# Patient Record
Sex: FEMALE | Race: WHITE | Employment: PART TIME | URBAN - METROPOLITAN AREA
[De-identification: names, ages, dates, MRNs, and addresses within clinical notes are randomized per-mention and may not be internally consistent; named-entity substitution may affect disease eponyms.]

---

## 2020-01-31 ENCOUNTER — ANESTHESIA (OUTPATIENT)
Dept: SURGERY | Facility: CLINIC | Age: 20
End: 2020-01-31
Payer: COMMERCIAL

## 2020-01-31 ENCOUNTER — HOSPITAL ENCOUNTER (OUTPATIENT)
Facility: CLINIC | Age: 20
Setting detail: OBSERVATION
Discharge: HOME | End: 2020-02-01
Attending: EMERGENCY MEDICINE | Admitting: EMERGENCY MEDICINE
Payer: COMMERCIAL

## 2020-01-31 ENCOUNTER — ANESTHESIA EVENT (OUTPATIENT)
Dept: SURGERY | Facility: CLINIC | Age: 20
End: 2020-01-31
Payer: COMMERCIAL

## 2020-01-31 DIAGNOSIS — K22.2 ACUTE ESOPHAGEAL OBSTRUCTION: ICD-10-CM

## 2020-01-31 DIAGNOSIS — K22.2 ESOPHAGEAL STRICTURE: ICD-10-CM

## 2020-01-31 DIAGNOSIS — K22.2 ESOPHAGEAL OBSTRUCTION: Primary | ICD-10-CM

## 2020-01-31 LAB
ANION GAP SERPL CALCULATED.3IONS-SCNC: 4 MMOL/L (ref 3–14)
BUN SERPL-MCNC: 11 MG/DL (ref 7–30)
CALCIUM SERPL-MCNC: 9.2 MG/DL (ref 8.5–10.1)
CHLORIDE SERPL-SCNC: 106 MMOL/L (ref 94–109)
CO2 SERPL-SCNC: 29 MMOL/L (ref 20–32)
CREAT SERPL-MCNC: 0.59 MG/DL (ref 0.52–1.04)
ERYTHROCYTE [DISTWIDTH] IN BLOOD BY AUTOMATED COUNT: 13.2 % (ref 10–15)
GFR SERPL CREATININE-BSD FRML MDRD: >90 ML/MIN/{1.73_M2}
GLUCOSE SERPL-MCNC: 90 MG/DL (ref 70–99)
HCG UR QL: NEGATIVE
HCT VFR BLD AUTO: 42.1 % (ref 35–47)
HGB BLD-MCNC: 13.3 G/DL (ref 11.7–15.7)
INR PPP: 1.15 (ref 0.86–1.14)
MCH RBC QN AUTO: 27.2 PG (ref 26.5–33)
MCHC RBC AUTO-ENTMCNC: 31.6 G/DL (ref 31.5–36.5)
MCV RBC AUTO: 86 FL (ref 78–100)
PLATELET # BLD AUTO: 300 10E9/L (ref 150–450)
POTASSIUM SERPL-SCNC: 3.5 MMOL/L (ref 3.4–5.3)
RBC # BLD AUTO: 4.89 10E12/L (ref 3.8–5.2)
SODIUM SERPL-SCNC: 139 MMOL/L (ref 133–144)
WBC # BLD AUTO: 7.6 10E9/L (ref 4–11)

## 2020-01-31 PROCEDURE — 37000008 ZZH ANESTHESIA TECHNICAL FEE, 1ST 30 MIN: Performed by: INTERNAL MEDICINE

## 2020-01-31 PROCEDURE — 25000128 H RX IP 250 OP 636: Performed by: STUDENT IN AN ORGANIZED HEALTH CARE EDUCATION/TRAINING PROGRAM

## 2020-01-31 PROCEDURE — 25000566 ZZH SEVOFLURANE, EA 15 MIN: Performed by: INTERNAL MEDICINE

## 2020-01-31 PROCEDURE — 88305 TISSUE EXAM BY PATHOLOGIST: CPT | Performed by: INTERNAL MEDICINE

## 2020-01-31 PROCEDURE — 96374 THER/PROPH/DIAG INJ IV PUSH: CPT | Performed by: EMERGENCY MEDICINE

## 2020-01-31 PROCEDURE — 36000051 ZZH SURGERY LEVEL 2 1ST 30 MIN - UMMC: Performed by: INTERNAL MEDICINE

## 2020-01-31 PROCEDURE — 85027 COMPLETE CBC AUTOMATED: CPT | Performed by: STUDENT IN AN ORGANIZED HEALTH CARE EDUCATION/TRAINING PROGRAM

## 2020-01-31 PROCEDURE — 85610 PROTHROMBIN TIME: CPT | Performed by: STUDENT IN AN ORGANIZED HEALTH CARE EDUCATION/TRAINING PROGRAM

## 2020-01-31 PROCEDURE — 25000125 ZZHC RX 250: Performed by: STUDENT IN AN ORGANIZED HEALTH CARE EDUCATION/TRAINING PROGRAM

## 2020-01-31 PROCEDURE — 99220 ZZC INITIAL OBSERVATION CARE,LEVL III: CPT | Mod: Z6 | Performed by: PHYSICIAN ASSISTANT

## 2020-01-31 PROCEDURE — 71000014 ZZH RECOVERY PHASE 1 LEVEL 2 FIRST HR: Performed by: INTERNAL MEDICINE

## 2020-01-31 PROCEDURE — 36000053 ZZH SURGERY LEVEL 2 EA 15 ADDTL MIN - UMMC: Performed by: INTERNAL MEDICINE

## 2020-01-31 PROCEDURE — G0378 HOSPITAL OBSERVATION PER HR: HCPCS

## 2020-01-31 PROCEDURE — 81025 URINE PREGNANCY TEST: CPT | Performed by: EMERGENCY MEDICINE

## 2020-01-31 PROCEDURE — 99285 EMERGENCY DEPT VISIT HI MDM: CPT | Mod: 25 | Performed by: EMERGENCY MEDICINE

## 2020-01-31 PROCEDURE — 80048 BASIC METABOLIC PNL TOTAL CA: CPT | Performed by: STUDENT IN AN ORGANIZED HEALTH CARE EDUCATION/TRAINING PROGRAM

## 2020-01-31 PROCEDURE — 25800030 ZZH RX IP 258 OP 636: Performed by: EMERGENCY MEDICINE

## 2020-01-31 PROCEDURE — 27210794 ZZH OR GENERAL SUPPLY STERILE: Performed by: INTERNAL MEDICINE

## 2020-01-31 PROCEDURE — 25800030 ZZH RX IP 258 OP 636: Performed by: STUDENT IN AN ORGANIZED HEALTH CARE EDUCATION/TRAINING PROGRAM

## 2020-01-31 PROCEDURE — 25800030 ZZH RX IP 258 OP 636: Performed by: ANESTHESIOLOGY

## 2020-01-31 PROCEDURE — 25000128 H RX IP 250 OP 636: Performed by: EMERGENCY MEDICINE

## 2020-01-31 PROCEDURE — 40000170 ZZH STATISTIC PRE-PROCEDURE ASSESSMENT II: Performed by: INTERNAL MEDICINE

## 2020-01-31 PROCEDURE — 25000125 ZZHC RX 250: Performed by: INTERNAL MEDICINE

## 2020-01-31 PROCEDURE — 37000009 ZZH ANESTHESIA TECHNICAL FEE, EACH ADDTL 15 MIN: Performed by: INTERNAL MEDICINE

## 2020-01-31 RX ORDER — ONDANSETRON 2 MG/ML
4 INJECTION INTRAMUSCULAR; INTRAVENOUS EVERY 30 MIN PRN
Status: DISCONTINUED | OUTPATIENT
Start: 2020-01-31 | End: 2020-02-01 | Stop reason: HOSPADM

## 2020-01-31 RX ORDER — ACETAMINOPHEN 650 MG/1
650 SUPPOSITORY RECTAL EVERY 4 HOURS PRN
Status: DISCONTINUED | OUTPATIENT
Start: 2020-01-31 | End: 2020-02-01 | Stop reason: HOSPADM

## 2020-01-31 RX ORDER — ACETAMINOPHEN 325 MG/1
650 TABLET ORAL EVERY 4 HOURS PRN
Status: DISCONTINUED | OUTPATIENT
Start: 2020-01-31 | End: 2020-02-01 | Stop reason: CLARIF

## 2020-01-31 RX ORDER — LIDOCAINE 40 MG/G
CREAM TOPICAL
Status: DISCONTINUED | OUTPATIENT
Start: 2020-01-31 | End: 2020-02-01 | Stop reason: HOSPADM

## 2020-01-31 RX ORDER — FENTANYL CITRATE 50 UG/ML
INJECTION, SOLUTION INTRAMUSCULAR; INTRAVENOUS PRN
Status: DISCONTINUED | OUTPATIENT
Start: 2020-01-31 | End: 2020-01-31

## 2020-01-31 RX ORDER — EPINEPHRINE 0.3 MG/.3ML
0.3 INJECTION SUBCUTANEOUS PRN
COMMUNITY

## 2020-01-31 RX ORDER — NALOXONE HYDROCHLORIDE 0.4 MG/ML
.1-.4 INJECTION, SOLUTION INTRAMUSCULAR; INTRAVENOUS; SUBCUTANEOUS
Status: DISCONTINUED | OUTPATIENT
Start: 2020-01-31 | End: 2020-02-01 | Stop reason: HOSPADM

## 2020-01-31 RX ORDER — SODIUM CHLORIDE, SODIUM LACTATE, POTASSIUM CHLORIDE, CALCIUM CHLORIDE 600; 310; 30; 20 MG/100ML; MG/100ML; MG/100ML; MG/100ML
INJECTION, SOLUTION INTRAVENOUS CONTINUOUS
Status: DISCONTINUED | OUTPATIENT
Start: 2020-01-31 | End: 2020-02-01 | Stop reason: HOSPADM

## 2020-01-31 RX ORDER — ONDANSETRON 2 MG/ML
INJECTION INTRAMUSCULAR; INTRAVENOUS PRN
Status: DISCONTINUED | OUTPATIENT
Start: 2020-01-31 | End: 2020-01-31

## 2020-01-31 RX ORDER — SODIUM CHLORIDE, SODIUM LACTATE, POTASSIUM CHLORIDE, CALCIUM CHLORIDE 600; 310; 30; 20 MG/100ML; MG/100ML; MG/100ML; MG/100ML
INJECTION, SOLUTION INTRAVENOUS CONTINUOUS PRN
Status: DISCONTINUED | OUTPATIENT
Start: 2020-01-31 | End: 2020-01-31

## 2020-01-31 RX ORDER — ONDANSETRON 4 MG/1
4 TABLET, ORALLY DISINTEGRATING ORAL EVERY 30 MIN PRN
Status: DISCONTINUED | OUTPATIENT
Start: 2020-01-31 | End: 2020-02-01 | Stop reason: HOSPADM

## 2020-01-31 RX ORDER — FLUMAZENIL 0.1 MG/ML
0.2 INJECTION, SOLUTION INTRAVENOUS
Status: DISCONTINUED | OUTPATIENT
Start: 2020-01-31 | End: 2020-02-01 | Stop reason: HOSPADM

## 2020-01-31 RX ORDER — ONDANSETRON 2 MG/ML
4 INJECTION INTRAMUSCULAR; INTRAVENOUS EVERY 6 HOURS PRN
Status: DISCONTINUED | OUTPATIENT
Start: 2020-01-31 | End: 2020-02-01 | Stop reason: HOSPADM

## 2020-01-31 RX ORDER — ONDANSETRON 4 MG/1
4 TABLET, ORALLY DISINTEGRATING ORAL EVERY 6 HOURS PRN
Status: DISCONTINUED | OUTPATIENT
Start: 2020-01-31 | End: 2020-02-01 | Stop reason: HOSPADM

## 2020-01-31 RX ORDER — DIPHENHYDRAMINE HCL 25 MG
25 TABLET ORAL EVERY 6 HOURS PRN
COMMUNITY

## 2020-01-31 RX ORDER — KETOROLAC TROMETHAMINE 30 MG/ML
30 INJECTION, SOLUTION INTRAMUSCULAR; INTRAVENOUS ONCE
Status: COMPLETED | OUTPATIENT
Start: 2020-01-31 | End: 2020-01-31

## 2020-01-31 RX ORDER — SODIUM CHLORIDE 9 MG/ML
1000 INJECTION, SOLUTION INTRAVENOUS CONTINUOUS
Status: DISCONTINUED | OUTPATIENT
Start: 2020-01-31 | End: 2020-02-01 | Stop reason: HOSPADM

## 2020-01-31 RX ORDER — PROPOFOL 10 MG/ML
INJECTION, EMULSION INTRAVENOUS PRN
Status: DISCONTINUED | OUTPATIENT
Start: 2020-01-31 | End: 2020-01-31

## 2020-01-31 RX ORDER — DEXAMETHASONE SODIUM PHOSPHATE 4 MG/ML
INJECTION, SOLUTION INTRA-ARTICULAR; INTRALESIONAL; INTRAMUSCULAR; INTRAVENOUS; SOFT TISSUE PRN
Status: DISCONTINUED | OUTPATIENT
Start: 2020-01-31 | End: 2020-01-31

## 2020-01-31 RX ORDER — IBUPROFEN 200 MG
200 TABLET ORAL EVERY 4 HOURS PRN
COMMUNITY

## 2020-01-31 RX ORDER — MEPERIDINE HYDROCHLORIDE 25 MG/ML
12.5 INJECTION INTRAMUSCULAR; INTRAVENOUS; SUBCUTANEOUS
Status: DISCONTINUED | OUTPATIENT
Start: 2020-01-31 | End: 2020-02-01 | Stop reason: HOSPADM

## 2020-01-31 RX ORDER — GLYCOPYRROLATE 0.2 MG/ML
INJECTION, SOLUTION INTRAMUSCULAR; INTRAVENOUS PRN
Status: DISCONTINUED | OUTPATIENT
Start: 2020-01-31 | End: 2020-01-31

## 2020-01-31 RX ORDER — FENTANYL CITRATE 50 UG/ML
25-50 INJECTION, SOLUTION INTRAMUSCULAR; INTRAVENOUS EVERY 5 MIN PRN
Status: DISCONTINUED | OUTPATIENT
Start: 2020-01-31 | End: 2020-02-01 | Stop reason: HOSPADM

## 2020-01-31 RX ORDER — SODIUM CHLORIDE 9 MG/ML
INJECTION, SOLUTION INTRAVENOUS CONTINUOUS
Status: DISCONTINUED | OUTPATIENT
Start: 2020-01-31 | End: 2020-02-01 | Stop reason: HOSPADM

## 2020-01-31 RX ADMIN — PROPOFOL 50 MG: 10 INJECTION, EMULSION INTRAVENOUS at 22:05

## 2020-01-31 RX ADMIN — GLYCOPYRROLATE 0.1 MG: 0.2 INJECTION, SOLUTION INTRAMUSCULAR; INTRAVENOUS at 21:59

## 2020-01-31 RX ADMIN — FENTANYL CITRATE 50 MCG: 50 INJECTION, SOLUTION INTRAMUSCULAR; INTRAVENOUS at 21:59

## 2020-01-31 RX ADMIN — PROPOFOL 150 MG: 10 INJECTION, EMULSION INTRAVENOUS at 21:59

## 2020-01-31 RX ADMIN — MIDAZOLAM 1 MG: 1 INJECTION INTRAMUSCULAR; INTRAVENOUS at 21:50

## 2020-01-31 RX ADMIN — KETOROLAC TROMETHAMINE 30 MG: 30 INJECTION, SOLUTION INTRAMUSCULAR at 19:32

## 2020-01-31 RX ADMIN — PROPOFOL 30 MG: 10 INJECTION, EMULSION INTRAVENOUS at 22:24

## 2020-01-31 RX ADMIN — SODIUM CHLORIDE 1000 ML: 9 INJECTION, SOLUTION INTRAVENOUS at 19:32

## 2020-01-31 RX ADMIN — SODIUM CHLORIDE, POTASSIUM CHLORIDE, SODIUM LACTATE AND CALCIUM CHLORIDE: 600; 310; 30; 20 INJECTION, SOLUTION INTRAVENOUS at 21:50

## 2020-01-31 RX ADMIN — PROPOFOL 10 MG: 10 INJECTION, EMULSION INTRAVENOUS at 22:15

## 2020-01-31 RX ADMIN — SODIUM CHLORIDE, POTASSIUM CHLORIDE, SODIUM LACTATE AND CALCIUM CHLORIDE: 600; 310; 30; 20 INJECTION, SOLUTION INTRAVENOUS at 23:15

## 2020-01-31 RX ADMIN — PROPOFOL 20 MG: 10 INJECTION, EMULSION INTRAVENOUS at 22:20

## 2020-01-31 RX ADMIN — DEXAMETHASONE SODIUM PHOSPHATE 4 MG: 4 INJECTION, SOLUTION INTRA-ARTICULAR; INTRALESIONAL; INTRAMUSCULAR; INTRAVENOUS; SOFT TISSUE at 22:16

## 2020-01-31 RX ADMIN — Medication 100 MG: at 21:59

## 2020-01-31 RX ADMIN — PROPOFOL 10 MG: 10 INJECTION, EMULSION INTRAVENOUS at 22:29

## 2020-01-31 RX ADMIN — ONDANSETRON 4 MG: 2 INJECTION INTRAMUSCULAR; INTRAVENOUS at 22:16

## 2020-01-31 ASSESSMENT — MIFFLIN-ST. JEOR: SCORE: 1434.93

## 2020-01-31 NOTE — ED NOTES
Bed: ED18  Expected date: 1/31/20  Expected time:   Means of arrival:   Comments:  Ninfa Vitale 19F with esophageal obstruction, ?ulcer from pill taken last night. Not able to swallow secretions.      Katherine Clarke MD  01/31/20 [id number]

## 2020-01-31 NOTE — LETTER
Patient:  Dakota Vitale  :   2000  MRN:     [id number]        Ms.Madeline CHIDI Vitale  [address] [facility] [address]        2020    Dear ,    We are writing to inform you of your test results. In short, the biopsies of your esophagus returned as expected supporting a diagnosis of eosinophilic esophagitis. As discussed previously, our plan includes the topical steroid, daily PPI and repeat endoscopy which may be with me but preferably with my partner Dr Hobbs who I will ask to manage your case in the future as this is within his subspecialized expertise.     I have included the formal documtentation of the results below. It continues to be a pleasure participating in your care.  Please feel free to contact our clinic with any further questions.      Sincerely,    Alexander Yi MD PhD FACG CINTHYA LÓPEZ  Director of Endoscopy  Associate Professor of Medicine, Surgery and Pediatrics  Interventional and Therapeutic Endoscopy    [facility]  Division of Gastroenterology and Hepatology  [facility] [address] [address]    New Consultations  [phone]  Procedure Scheduling [phone]  Clinical Nurse Coordinator [phone]  Clinical Fax   [phone]  Administrative   [phone]  Administrative Fax  [phone]        Resulted Orders   Surgical pathology exam   Result Value Ref Range    Copath Report       Patient Name: DAKOTA VITALE  MR#: [id number]  Specimen #: [id number]  Collected: 2020  Received: 2020  Reported: 2020 16:35  Ordering Phy(s): ALEXANDER YI    For improved result formatting, select 'View Enhanced Report Format' under   Linked Documents section.    SPECIMEN(S):  Esophageal biopsy    FINAL DIAGNOSIS:  Esophagus, Biopsy:  Esophagitis with up to 45 intra-squamous epithelial eosinophils per HPF;   consistent with eosinophilic  esophagitis.    I have personally reviewed  "all specimens and/or slides, including the   listed special stains, and used them  with my medical judgement to determine or confirm the final diagnosis.    Electronically signed out by:    Maribel Orta M.D., [facility], [facility]    CLINICAL HISTORY:  Food impaction of esophagus, rule out eosinophilic esophagitis.    GROSS:  The specimen is received in formalin with proper patient identification,   labeled \"esophagus rule out  eosinophilic esophagitis\".  The specimen consists of mu ltiple segments of   white- tan soft tissue ranging from  0.1-0.3 cm in greatest dimension.  It is filtered, wrapped and entirely   submitted in cassette A1. (Dictated  by: Elina BAI [facility] 2/3/2020 09:30 AM)    MICROSCOPIC:  Microscopic examination was performed.    The technical component of this testing was completed at the [facility], with the professional component performed   at the [facility], [address] [place] [phone] ([phone])    CPT Codes:  A: 82884-QL6    COLLECTION SITE:  Client: [facility]  Location: [facility] (B)    Resident  [id number]               "

## 2020-01-31 NOTE — ED TRIAGE NOTES
Patient presents to triage with swollen esophagus after taking an ibuprofen last night. Patient has been unable to swallow secretions since then and cannot tolerate any intake. No difficulty breathing or speaking. Sent here from [place] for GI consult and probable endoscopy.

## 2020-01-31 NOTE — ED PROVIDER NOTES
History     Chief Complaint   Patient presents with     Swallowed Foreign Body     HPI  Tracie Vitale is a 19 year old otherwise healthy female who presents the emergency department with chief complaint of difficulty swallowing.  The patient took an ibuprofen tablet last night.  Immediately, she developed pain and difficulty swallowing.  She has had a sensation of having the pill stuck in her throat since then.  She did not present for evaluation until later today because she went to her classes (she is currently a nursing student).  She is currently unable to swallow even her secretions.  She is also tried to drink water and has not been able to swallow this completely.  The patient has had swallowing difficulty in the past with pills, particularly round pills.  The patient took the pill approximately 10 PM last night, nothing else taken p.o around the time of onset of symptoms.  An x-ray was done at [facility] which showed signs concerning for esophageal spasm.  Possible pill fragments seen in the esophagus on x-ray as well.  Due to history, there is concern for esophageal ulcer as well.  She was sent here for further evaluation by GI due to these findings.  No history of eosinophilic esophagitis or other esophageal obstruction in the past.  No associated difficulty breathing or speaking.    I have reviewed the Medications, Allergies, Past Medical and Surgical History, and Social History in the Epic system.  No past medical history on file.  No past surgical history on file.  No current facility-administered medications for this encounter.      No current outpatient medications on file.        Allergies   Allergen Reactions     Coconut Oil Anaphylaxis     Sesame Oil Anaphylaxis     Tree Nuts [Nuts] Anaphylaxis     Social History     Socioeconomic History     Marital status: Not on file     Spouse name: Not on file     Number of children: Not on file     Years of education: Not on file      Highest education level: Not on file   Occupational History     Not on file   Social Needs     Financial resource strain: Not on file     Food insecurity:     Worry: Not on file     Inability: Not on file     Transportation needs:     Medical: Not on file     Non-medical: Not on file   Tobacco Use     Smoking status: Not on file   Substance and Sexual Activity     Alcohol use: Not on file     Drug use: Not on file     Sexual activity: Not on file   Lifestyle     Physical activity:     Days per week: Not on file     Minutes per session: Not on file     Stress: Not on file   Relationships     Social connections:     Talks on phone: Not on file     Gets together: Not on file     Attends Buddhist service: Not on file     Active member of club or organization: Not on file     Attends meetings of clubs or organizations: Not on file     Relationship status: Not on file     Intimate partner violence:     Fear of current or ex partner: Not on file     Emotionally abused: Not on file     Physically abused: Not on file     Forced sexual activity: Not on file   Other Topics Concern     Not on file   Social History Narrative     Not on file       Review of Systems  General: No fevers or chills  Skin: No rash or diaphoresis  Eyes: No eye redness or discharge  Ears/Nose/Throat: No rhinorrhea or nasal congestion  Respiratory: No cough or SOB  Cardiovascular: No chest pain or palpitations  Gastrointestinal: No nausea, vomiting, or diarrhea, positive for difficulty swallowing  Genitourinary: No urinary frequency, hematuria, or dysuria  Musculoskeletal: No arthralgias or myalgias  Neurologic: No numbness or weakness  Hematologic/Lymphatic/Immunologic: No leg swelling, no easy bruising/bleeding  Endocrine: No polyuria/polydypsia      Physical Exam          Physical Exam  General: Well nourished, well developed, NAD  HEENT: EOMI, anicteric. NCAT, MMM, unable to tolerate swallowing own secretions  Neck: no jugular venous distension,  supple, nl ROM  Cardiac: Regular rate and rhythm. No murmurs, rubs, or gallops. Normal S1, S2.  Intact peripheral pulses  Pulm: CTAB, no stridor, wheezes, rales, rhonchi  Abd: Soft, nontender, nondistended.  No masses palpated.    Skin: Warm and dry to the touch.  No rash  Extremities: No LE edema, no cyanosis, w/w/p  Neuro: A&Ox3, no gross focal deficits        ED Course        Procedures             Critical Care time:  none             Labs Ordered and Resulted from Time of ED Arrival Up to the Time of Departure from the ED   INR - Abnormal; Notable for the following components:       Result Value    INR 1.15 (*)     All other components within normal limits   CBC WITH PLATELETS   BASIC METABOLIC PANEL          Results for orders placed or performed during the hospital encounter of 01/31/20 (from the past 24 hour(s))   HCG qualitative urine   Result Value Ref Range    HCG Qual Urine Negative NEG^Negative   UPPER GI ENDOSCOPY   Result Value Ref Range    Upper GI Endoscopy       [address] [place] [address] [phone]     Endoscopy Department  _______________________________________________________________________________  Patient Name: Tracie Vitale      Procedure Date: 1/31/2020 10:05 PM  MRN: [id number]                       Account Number: [id number]  YOB: 2000             Admit Type: Inpatient  Age: 19                                Gender: Female  Note Status: Finalized                Attending MD: Alexander Miller MD  Total Sedation Time:                    _______________________________________________________________________________     Procedure:           Upper GI endoscopy  Indications:         Foreign body in the esophagus  Providers:           Alexander Miller MD, Jelena Aceves  Patient Profile:     This is a healthy 19 year old who reports long standing                        issues with intermittent dysphagia (relieved with fluid                         intake) worse with taking pi lls who presents to the                        hospital after being unable to clear her salivary                        secretions since 10 PM last night. Reports taking a                        small ibuprofen pill at that time and ate salad around                        8:30 PM.  Referring MD:          Medicines:           General Anesthesia  Complications:       No immediate complications.  _______________________________________________________________________________  Procedure:           Pre-Anesthesia Assessment:                       - Prior to the procedure, a History and Physical was                        performed, and patient medications and allergies were                        reviewed. The patient is competent. The risks and                        benefits of the procedure and the sedation options and                        risks were discussed with the patient. All questions                        were answered and informed consent was obtained. Patient                         identification and proposed procedure were verified by                        the physician in the pre-procedure area. Mental Status                        Examination: alert and oriented. Airway Examination:                        normal oropharyngeal airway and neck mobility.                        Respiratory Examination: clear to auscultation. CV                        Examination: normal. Prophylactic Antibiotics: The                        patient does not require prophylactic antibiotics. Prior                        Anticoagulants: The patient has taken no previous                        anticoagulant or antiplatelet agents. ASA Grade                        Assessment: I - A normal, healthy patient. After                        reviewing the risks and benefits, the patient was deemed                        in satisfactory condition to undergo the procedure. The                         anesthesia plan was to use general anesthesia.                        Immediately prior  to administration of medications, the                        patient was re-assessed for adequacy to receive                        sedatives. The heart rate, respiratory rate, oxygen                        saturations, blood pressure, adequacy of pulmonary                        ventilation, and response to care were monitored                        throughout the procedure. The physical status of the                        patient was re-assessed after the procedure. After                        obtaining informed consent, the endoscope was passed                        under direct vision. Throughout the procedure, the                        patient's blood pressure, pulse, and oxygen saturations                        were monitored continuously. The endoscope was                        introduced through the mouth, and advanced to the second                        part of duodenum. The upper GI endoscopy was                        accomplished without difficulty. The patient tole rated                        the procedure well.                                                                                   Findings:       Impacted food and pill material was found in the middle third of the        esophagus from 24 to 25 cm. We were able to use the rat tooth forceps to        remove a portion and push the remaing portion in to the stomach. The        area of the impacted food had developed superficial pressure ulceration.       Mucosal changes including ringed esophagus, longitudinal furrows,        small-caliber esophagus and white plaques were found in the entire        esophagus. Esophageal findings were graded using the Eosinophilic        Esophagitis Endoscopic Reference Score (EoE-EREFS) as: Edema Grade 1        Present (decreased clarity or absence of vascular markings), Rings Grade        3 Severe  (distinct rings that do not permit passage of diagnostic 8-10        mm endoscope), Exudates Grade 1 Mild (scattered white lesions involving        less than  10 percent of the esophageal surface area), Furrows Grade 1        Present (vertical lines with or without visible depth) and Stricture        present (8 mm luminal diameter). Biopsies were taken with from the mid        esophagus with a cold forceps for histology.       One benign-appearing, intrinsic severe (stenosis; an endoscope cannot        pass) stenosis was found 23 to 24 cm from the incisors. This stenosis        measured 8 mm (inner diameter) x 1 cm (in length). The stenosis was        traversed after downsizing scope to a pediatric endoscope.       The gastric fundus, gastric body, gastric antrum and pylorus were normal.       The duodenal bulb, first portion of the duodenum, second portion of the        duodenum and major papilla were normal.                                                                                   Impression:          - The cause of patient's symptoms is stricturing in mid                        esophagus with a food/pill impaction which was                         successfully cleared endoscopically. The stricturing and                        esophageal mucosal changes are highly suggestive of                        eosinophilic esophagiits. The mid esophageal stricture                        was severe and 8 mm in diameter and we were unable to                        pass the adult gastroscope beyond the stricture                        necessitating downsizing to a pediatric gastroscope to                        complete the endoscopic examination. Biopsies taken from                        mid esophagus to confirm eosinophilic esophagitis.  Recommendation:      - We will follow up on the pathology results.                       - Patient should be started on high dose PPI twice daily                        in liquid  form (Pantoprazole oral suspension 40 mg BID)                       - Please prescribe Fluticasone 220 mcgs inhaler on                        discharge. Patient will need to take two puffs twice                         daily and swallow instead of inhaling. She should stay                        upright for at least 30 min after swallowing the                        contents of the inhaler.                       - Patient should be on low residue diet till she has a                        follow up. Instructed to chew food properly before                        swallowing.                       - She will need a repeat upper endoscopy in 4 weeks to                        reassess for response to her therapy and need for                        dilation                                                                                     electronically signed by BETHANY Miller  _____________________  Alexander Miller MD  2/1/2020 7:27:57 AM  I was physically present for the entire viewing portion of the exam.  __________________________  Signature of teaching physician  Jamia/Rhys Miller MD    ____________  Jelena Aceves,   Number of Addenda: 0    Note Initiated On: 1/31/2020 10:05 PM  Scope In:   Scope Out:         Labs, vital signs, and imaging studies were reviewed by me.    Assessments & Plan (with Medical Decision Making)   The patient is a 19-year-old female presenting with inability to swallow after taking a pill last night.  X-ray at [place] was reportedly concerning for esophageal obstruction with possible retained pill fragments, differential diagnosis includes esophageal stricture, food impaction, esophageal ulcer (secondary to pill entrapment in the esophagus), esophageal spasm, eosinophilic esophagitis.    1719 Pt was d/w GI, they will come to the ER to evaluate the patient    Patient has not been able to tolerate p.o. since yesterday, she states that she feels dehydrated.  IV fluids were ordered.  She  also states that she has a mild headache, which she feels is consistent with dehydration.  Toradol to also be administered IV.    I have reviewed the nursing notes.    I have reviewed the findings, diagnosis, plan and need for follow up with the patient.  GI has seen the patient in the emergency department, they plan to take the patient to the OR for endoscopy later this evening when the OR is available.  However, they suspect that OR will not be available for some time and recommend that patient be admitted for observation overnight after the procedure.  Patient was discussed with ED observation unit, they will admit the patient..    Discharge Medication List as of 2/1/2020 10:13 AM      START taking these medications    Details   fluticasone (FLOVENT HFA) 220 MCG/ACT inhaler Inhale 2 puffs into the lungs every 12 hours, Disp-12 g, R-1, E-Prescribe      pantoprazole (PROTONIX) 2 mg/mL SUSP suspension Take 20 mLs (40 mg) by mouth 2 times daily (before meals), Disp-1600 mL, R-0, E-Prescribe             Final diagnoses:   Acute esophageal obstruction       1/31/2020   [facility], [place], EMERGENCY DEPARTMENT     Katherine Clarke MD  02/01/20 2018

## 2020-02-01 VITALS
OXYGEN SATURATION: 97 % | TEMPERATURE: 96.3 F | RESPIRATION RATE: 16 BRPM | SYSTOLIC BLOOD PRESSURE: 101 MMHG | WEIGHT: 138.6 LBS | HEART RATE: 64 BPM | BODY MASS INDEX: 21.01 KG/M2 | DIASTOLIC BLOOD PRESSURE: 41 MMHG | HEIGHT: 68 IN

## 2020-02-01 LAB — UPPER GI ENDOSCOPY: NORMAL

## 2020-02-01 PROCEDURE — 25800030 ZZH RX IP 258 OP 636: Performed by: PHYSICIAN ASSISTANT

## 2020-02-01 PROCEDURE — 96361 HYDRATE IV INFUSION ADD-ON: CPT

## 2020-02-01 PROCEDURE — 25000132 ZZH RX MED GY IP 250 OP 250 PS 637: Performed by: PHYSICIAN ASSISTANT

## 2020-02-01 PROCEDURE — G0378 HOSPITAL OBSERVATION PER HR: HCPCS

## 2020-02-01 PROCEDURE — 99217 ZZC OBSERVATION CARE DISCHARGE: CPT | Mod: Z6 | Performed by: INTERNAL MEDICINE

## 2020-02-01 RX ORDER — FLUTICASONE PROPIONATE 220 UG/1
2 AEROSOL, METERED RESPIRATORY (INHALATION) EVERY 12 HOURS
Qty: 12 G | Refills: 1 | Status: SHIPPED | OUTPATIENT
Start: 2020-02-01

## 2020-02-01 RX ORDER — FLUTICASONE PROPIONATE 220 UG/1
2 AEROSOL, METERED RESPIRATORY (INHALATION) EVERY 12 HOURS
Status: DISCONTINUED | OUTPATIENT
Start: 2020-02-01 | End: 2020-02-01 | Stop reason: HOSPADM

## 2020-02-01 RX ADMIN — SODIUM CHLORIDE: 9 INJECTION, SOLUTION INTRAVENOUS at 02:29

## 2020-02-01 RX ADMIN — PANTOPRAZOLE SODIUM 40 MG: 40 TABLET, DELAYED RELEASE ORAL at 10:14

## 2020-02-01 RX ADMIN — FLUTICASONE PROPIONATE 2 PUFF: 220 AEROSOL, METERED RESPIRATORY (INHALATION) at 10:15

## 2020-02-01 ASSESSMENT — ENCOUNTER SYMPTOMS
DIETARY ISSUES: ADEQUATE INTAKE
NO PATIENT REPORTED PAIN: 1
ACTIVITY IMPAIRMENT: NORMAL

## 2020-02-01 ASSESSMENT — MIFFLIN-ST. JEOR: SCORE: 1442.19

## 2020-02-01 NOTE — PHARMACY-ADMISSION MEDICATION HISTORY
Admission medication history interview status for the 1/31/2020 admission is complete. See Epic admission navigator for allergy information, pharmacy, prior to admission medications and immunization status.     Medication history interview sources:  Patient    Changes made to PTA medication list (reason)  Added:     Epipen: inject 0.3 mg into the muscle as needed for anaphylaxis (per patient)    Diphenhydramine 25 mg tablet: take 25 mg by mouth every 6 hours as needed for allergies (per patient)    Ibuprofen 200 mg tablet: take 200 mg by mouth every 4 hours as needed for mild pain (per patient)  Deleted: N/A  Changed: N/A    Additional medication history information (including reliability of information, actions taken by pharmacist):    The patient was a good historian of her medications. She confirms not being on any daily medications.     The patient states attempting to take 1 tablet of ibuprofen for back pain yesterday.     The patient states that she has not needed to use an Epipen in about 4 years. She was reminded to check the expiration date of her current Epipen.     Patient confirms current allergies and states no new allergies.     Patient preferred pharmacy: [facility]        Prior to Admission medications    Medication Sig Last Dose Taking? Auth Provider   EPINEPHrine (EPIPEN/ADRENACLICK/OR ANY BX GENERIC EQUIV) 0.3 MG/0.3ML injection 2-pack Inject 0.3 mg into the muscle as needed for anaphylaxis 4 years ago Yes Unknown, Entered By History   ibuprofen (ADVIL/MOTRIN) 200 MG tablet Take 200 mg by mouth every 4 hours as needed for mild pain 1/30/2020 at Unknown time Yes Unknown, Entered By History   diphenhydrAMINE (BENADRYL) 25 MG tablet Take 25 mg by mouth every 6 hours as needed for allergies More than a month at Unknown time  Unknown, Entered By History         Medication history completed by: SERENA Shook Pharmacy Intern

## 2020-02-01 NOTE — DISCHARGE SUMMARY
Discharge Summary    Tracie Vitale MRN# [id number]   YOB: 2000 Age: 19 year old     Date of Admission:  1/31/2020  Date of Discharge:  2/1/2020  Admitting Physician:  Zenaida Freeman MD  Discharge Physician:  Dr. Gill  Discharging Service:  Emergency Medicine     Primary Provider: Gilma Granados          Discharge Diagnosis:     Esophageal obstruction    * No resolved hospital problems. *               Discharge Disposition:   Discharged to home           Condition on Discharge:   Discharge condition: Stable   Code status on discharge: Full Code           Procedures:   Invasive procedures: endoscopy          Discharge Medications:     Current Discharge Medication List      START taking these medications    Details   fluticasone (FLOVENT HFA) 220 MCG/ACT inhaler Inhale 2 puffs into the lungs every 12 hours  Qty: 12 g, Refills: 1    Associated Diagnoses: Esophageal obstruction      pantoprazole (PROTONIX) 2 mg/mL SUSP suspension Take 20 mLs (40 mg) by mouth 2 times daily (before meals)  Qty: 1600 mL, Refills: 0    Associated Diagnoses: Esophageal obstruction         CONTINUE these medications which have NOT CHANGED    Details   ibuprofen (ADVIL/MOTRIN) 200 MG tablet Take 200 mg by mouth every 4 hours as needed for mild pain      diphenhydrAMINE (BENADRYL) 25 MG tablet Take 25 mg by mouth every 6 hours as needed for allergies      EPINEPHrine (EPIPEN/ADRENACLICK/OR ANY BX GENERIC EQUIV) 0.3 MG/0.3ML injection 2-pack Inject 0.3 mg into the muscle as needed for anaphylaxis                   Consultations:   Consultation during this admission received from gastroenterology             Brief History of Illness:   Please see detailed H&P from 1/31/2020, in brief: Tracie Vitale is a 19 year old female with no significant past medical history, presenting with esophageal obstruction          Hospital Course:   1. Esophageal obstruction: GI consulted and patient underwent EGD in OR.  GI was able to  "push the impacted food/pill material down into the stomach.  There was some narrowing in mid esophagus with mucousal changes suspicious for EoE, biopsies taken.  Normal stomach and duodenum.  She did well overnight and advanced to clear liquids this am with no problems.  VSS< afebrile. They recommended:  - Patient should be started on high dose PPI twice daily in liquid form (Pantoprazole oral suspension 40 mg BID)  - Please prescribe Fluticasone 220 mcgs inhaler on discharge. Patient will need to take two puffs twice daily and swallow instead of inhaling. She should stay upright for at least 30 min after swallowing the contents of the inhaler.  - Patient should be on low residue diet till she has a follow up. Instructed to chew food properly before swallowing.  - She will need a repeat upper endoscopy in 4 weeks to reassess for response to her therapy and need for dilation.            Final Day of Progress before Discharge:       Physical Exam:  Blood pressure 101/41, pulse 64, temperature 96.3  F (35.7  C), temperature source Oral, resp. rate 16, height 1.727 m (5' 8\"), weight 62.9 kg (138 lb 9.6 oz), last menstrual period 01/19/2020, SpO2 97 %, not currently breastfeeding.    EXAM:  Exam:  Constitutional: healthy, alert and no distress  Head: Normocephalic. No masses, lesions, tenderness or abnormalities  Neck: Neck supple. No adenopathy. Thyroid symmetric, normal size,,   ENT: ENT exam normal, no neck nodes or sinus tenderness  Cardiovascular: No lifts, heaves, or thrills. RRR. No murmurs, clicks gallops or rub  Respiratory: Good diaphragmatic excursion. Lungs clear  Gastrointestinal: Abdomen soft, non-tender. BS normal. No masses, organomegaly  Musculoskeletal: extremities normal- no gross deformities noted, gait normal and normal muscle tone  Skin: no suspicious lesions or rashes  Neurologic: Gait normal. Alert and oriented.   Psychiatric: mentation appears normal and affect normal/bright    /41   Pulse " "64   Temp 96.3  F (35.7  C) (Oral)   Resp 16   Ht 1.727 m (5' 8\")   Wt 62.9 kg (138 lb 9.6 oz)   LMP 01/19/2020 (Exact Date)   SpO2 97%   Breastfeeding No   BMI 21.07 kg/m               Data:  All laboratory data reviewed             Significant Results:     Results for orders placed or performed during the hospital encounter of 01/31/20   UPPER GI ENDOSCOPY     Status: None   Result Value Ref Range    Upper GI Endoscopy       [address] [place] [address] [phone]     Endoscopy Department  _______________________________________________________________________________  Patient Name: Tracie Vitale      Procedure Date: 1/31/2020 10:05 PM  MRN: [id number]                       Account Number: [id number]  YOB: 2000             Admit Type: Inpatient  Age: 19                                Gender: Female  Note Status: Finalized                Attending MD: Alexander Miller MD  Total Sedation Time:                    _______________________________________________________________________________     Procedure:           Upper GI endoscopy  Indications:         Foreign body in the esophagus  Providers:           Alexander Miller MD, Jelena Aceves  Patient Profile:     This is a healthy 19 year old who reports long standing                        issues with intermittent dysphagia (relieved with fluid                        intake) worse with taking pi lls who presents to the                        hospital after being unable to clear her salivary                        secretions since 10 PM last night. Reports taking a                        small ibuprofen pill at that time and ate salad around                        8:30 PM.  Referring MD:          Medicines:           General Anesthesia  Complications:       No immediate complications.  _______________________________________________________________________________  Procedure:           " Pre-Anesthesia Assessment:                       - Prior to the procedure, a History and Physical was                        performed, and patient medications and allergies were                        reviewed. The patient is competent. The risks and                        benefits of the procedure and the sedation options and                        risks were discussed with the patient. All questions                        were answered and informed consent was obtained. Patient                         identification and proposed procedure were verified by                        the physician in the pre-procedure area. Mental Status                        Examination: alert and oriented. Airway Examination:                        normal oropharyngeal airway and neck mobility.                        Respiratory Examination: clear to auscultation. CV                        Examination: normal. Prophylactic Antibiotics: The                        patient does not require prophylactic antibiotics. Prior                        Anticoagulants: The patient has taken no previous                        anticoagulant or antiplatelet agents. ASA Grade                        Assessment: I - A normal, healthy patient. After                        reviewing the risks and benefits, the patient was deemed                        in satisfactory condition to undergo the procedure. The                        anesthesia plan was to use general anesthesia.                        Immediately prior  to administration of medications, the                        patient was re-assessed for adequacy to receive                        sedatives. The heart rate, respiratory rate, oxygen                        saturations, blood pressure, adequacy of pulmonary                        ventilation, and response to care were monitored                        throughout the procedure. The physical status of the                        patient was  re-assessed after the procedure. After                        obtaining informed consent, the endoscope was passed                        under direct vision. Throughout the procedure, the                        patient's blood pressure, pulse, and oxygen saturations                        were monitored continuously. The endoscope was                        introduced through the mouth, and advanced to the second                        part of duodenum. The upper GI endoscopy was                        accomplished without difficulty. The patient tole rated                        the procedure well.                                                                                   Findings:       Impacted food and pill material was found in the middle third of the        esophagus from 24 to 25 cm. We were able to use the rat tooth forceps to        remove a portion and push the remaing portion in to the stomach. The        area of the impacted food had developed superficial pressure ulceration.       Mucosal changes including ringed esophagus, longitudinal furrows,        small-caliber esophagus and white plaques were found in the entire        esophagus. Esophageal findings were graded using the Eosinophilic        Esophagitis Endoscopic Reference Score (EoE-EREFS) as: Edema Grade 1        Present (decreased clarity or absence of vascular markings), Rings Grade        3 Severe (distinct rings that do not permit passage of diagnostic 8-10        mm endoscope), Exudates Grade 1 Mild (scattered white lesions involving        less than  10 percent of the esophageal surface area), Furrows Grade 1        Present (vertical lines with or without visible depth) and Stricture        present (8 mm luminal diameter). Biopsies were taken with from the mid        esophagus with a cold forceps for histology.       One benign-appearing, intrinsic severe (stenosis; an endoscope cannot        pass) stenosis was found 23 to 24 cm  from the incisors. This stenosis        measured 8 mm (inner diameter) x 1 cm (in length). The stenosis was        traversed after downsizing scope to a pediatric endoscope.       The gastric fundus, gastric body, gastric antrum and pylorus were normal.       The duodenal bulb, first portion of the duodenum, second portion of the        duodenum and major papilla were normal.                                                                                   Impression:          - The cause of patient's symptoms is stricturing in mid                        esophagus with a food/pill impaction which was                         successfully cleared endoscopically. The stricturing and                        esophageal mucosal changes are highly suggestive of                        eosinophilic esophagiits. The mid esophageal stricture                        was severe and 8 mm in diameter and we were unable to                        pass the adult gastroscope beyond the stricture                        necessitating downsizing to a pediatric gastroscope to                        complete the endoscopic examination. Biopsies taken from                        mid esophagus to confirm eosinophilic esophagitis.  Recommendation:      - We will follow up on the pathology results.                       - Patient should be started on high dose PPI twice daily                        in liquid form (Pantoprazole oral suspension 40 mg BID)                       - Please prescribe Fluticasone 220 mcgs inhaler on                        discharge. Patient will need to take two puffs twice                         daily and swallow instead of inhaling. She should stay                        upright for at least 30 min after swallowing the                        contents of the inhaler.                       - Patient should be on low residue diet till she has a                        follow up. Instructed to chew food properly before                         swallowing.                       - She will need a repeat upper endoscopy in 4 weeks to                        reassess for response to her therapy and need for                        dilation                                                                                     electronically signed by BETHANY Miller  _____________________  Alexander Miller MD  2/1/2020 7:27:57 AM  I was physically present for the entire viewing portion of the exam.  __________________________  Signature of teaching physician  Jamia/Rhys Miller MD    ____________  Jelena Aceves,   Number of Addenda: 0    Note Initiated On: 1/31/2020 10:05 PM  Scope In:   Scope Out:     CBC with platelets     Status: None   Result Value Ref Range    WBC 7.6 4.0 - 11.0 10e9/L    RBC Count 4.89 3.8 - 5.2 10e12/L    Hemoglobin 13.3 11.7 - 15.7 g/dL    Hematocrit 42.1 35.0 - 47.0 %    MCV 86 78 - 100 fl    MCH 27.2 26.5 - 33.0 pg    MCHC 31.6 31.5 - 36.5 g/dL    RDW 13.2 10.0 - 15.0 %    Platelet Count 300 150 - 450 10e9/L   Basic metabolic panel     Status: None   Result Value Ref Range    Sodium 139 133 - 144 mmol/L    Potassium 3.5 3.4 - 5.3 mmol/L    Chloride 106 94 - 109 mmol/L    Carbon Dioxide 29 20 - 32 mmol/L    Anion Gap 4 3 - 14 mmol/L    Glucose 90 70 - 99 mg/dL    Urea Nitrogen 11 7 - 30 mg/dL    Creatinine 0.59 0.52 - 1.04 mg/dL    GFR Estimate >90 >60 mL/min/[1.73_m2]    GFR Estimate If Black >90 >60 mL/min/[1.73_m2]    Calcium 9.2 8.5 - 10.1 mg/dL   INR     Status: Abnormal   Result Value Ref Range    INR 1.15 (H) 0.86 - 1.14   HCG qualitative urine     Status: None   Result Value Ref Range    HCG Qual Urine Negative NEG^Negative      No results found for this or any previous visit (from the past 48 hour(s)).             Pending Results:   Unresulted Labs Ordered in the Past 30 Days of this Admission     No orders found for last 31 day(s).                  Discharge Instructions and Follow-Up:     Discharge Procedure  Orders   GASTROENTEROLOGY ADULT REF PROCEDURE ONLY [facility] [place] [phone]   Referral Priority: Routine   Number of Visits Requested: 1     Reason for your hospital stay   Order Comments: You were admitted into observation at the hospital after getting a pill stuck in your esophagus.  GI was consulted and performed a scope under general anesthesia and pushed the pill into your stomach.  They also took biopsies to test for eosinophilic esophagitis as there was some narrowing in your esophagus.  They recommend new medications to take as well as a low residue diet, chew food fully before swallowing.  They would like a repeat EGD in 4 weeks.     Follow Up and recommended labs and tests   Order Comments: Follow up with primary care provider, Gilma Granados, within 7 days for hospital follow- up.  The following labs/tests are recommended: EGD in 4 weeks.     Activity   Order Comments: Your activity upon discharge: activity as tolerated     Order Specific Question Answer Comments   Is discharge order? Yes      When to contact your care team   Order Comments: Return to the ER if you have recurrent feelings of food getting stuck in your esophagus, vomiting, fever, blood in vomit.     Diet   Order Comments: Follow this diet upon discharge: Orders Placed This Encounter   low residue diet     Order Specific Question Answer Comments   Is discharge order? Yes           Attestation:  CAMILLE Crawford CNP.

## 2020-02-01 NOTE — PROVIDER NOTIFICATION
Provider was paged upon pt arrival to floor regarding CCM order. EM provider stated pt does not need to be on tele and RN asked for order to be discontinued. Provider also indicated pt could have ice chips post-op.

## 2020-02-01 NOTE — ANESTHESIA POSTPROCEDURE EVALUATION
Anesthesia POST Procedure Evaluation    Patient: Tracie Vitale   MRN:     [id number] Gender:   female   Age:    19 year old :      2000        Preoperative Diagnosis: Food impaction of esophagus, initial encounter [T18.128A]   Procedure(s):  Upper Endoscopy with biopsy, foreign body removal   Postop Comments: No value filed.       Anesthesia Type:  Not documented  General    Reportable Event: NO     PAIN: Uncomplicated   Sign Out status: Comfortable, Well controlled pain     PONV: No PONV   Sign Out status:  No Nausea or Vomiting     Neuro/Psych: Uneventful perioperative course   Sign Out Status: Preoperative baseline; Age appropriate mentation     Airway/Resp.: Uneventful perioperative course   Sign Out Status: Non labored breathing, age appropriate RR; Resp. Status within EXPECTED Parameters     CV: Uneventful perioperative course   Sign Out status: Appropriate BP and perfusion indices; Appropriate HR/Rhythm     Disposition:   Sign Out in:  PACU  Disposition:  Home  Recovery Course: Uneventful  Follow-Up: Not required           Last Anesthesia Record Vitals:  CRNA VITALS  2020 2207 - 2020 2258      2020             Resp Rate (observed):  (!) 6          Last PACU Vitals:  Vitals Value Taken Time   BP     Temp     Pulse     Resp     SpO2 98 % 2020 10:57 PM   Temp src     NIBP     Pulse     SpO2     Resp     Temp     Ht Rate     Temp 2     Vitals shown include unvalidated device data.      Electronically Signed By: Noman Rivers MD, 2020, 10:58 PM

## 2020-02-01 NOTE — ANESTHESIA CARE TRANSFER NOTE
Patient: Tracie Vitale    Procedure(s):  Upper Endoscopy with biopsy, foreign body removal    Diagnosis: Food impaction of esophagus, initial encounter [T18.128A]  Diagnosis Additional Information: No value filed.    Anesthesia Type:   General     Note:  Airway :Face Mask  Patient transferred to:PACU  Comments: VSS. Breathing spontaneously at a regular rate with adequate tidal volumes and maintaining O2 sats on RA. Denies nausea or pain. No apparent complications from anesthesia.     Allan Burns MD  CA-1      Handoff Report: Identifed the Patient, Identified the Reponsible Provider, Reviewed the pertinent medical history, Discussed the surgical course, Reviewed Intra-OP anesthesia mangement and issues during anesthesia, Set expectations for post-procedure period and Allowed opportunity for questions and acknowledgement of understanding      Vitals: (Last set prior to Anesthesia Care Transfer)    CRNA VITALS  1/31/2020 2207 - 1/31/2020 2249      1/31/2020             Resp Rate (observed):  (!) 6                Electronically Signed By: Allan Burns MD  January 31, 2020  10:49 PM

## 2020-02-01 NOTE — PROGRESS NOTES
"Tracie Vitale is a 19 year old female patient.  1. Esophageal obstruction    2. Acute esophageal obstruction      No past medical history on file.  No current outpatient medications on file.     Allergies   Allergen Reactions     Coconut Oil Anaphylaxis     Sesame Oil Anaphylaxis     Tree Nuts [Nuts] Anaphylaxis     Active Problems:    Esophageal obstruction    Blood pressure 101/41, pulse 64, temperature 96.3  F (35.7  C), temperature source Oral, resp. rate 16, height 1.727 m (5' 8\"), weight 62.9 kg (138 lb 9.6 oz), last menstrual period 01/19/2020, SpO2 97 %, not currently breastfeeding.    Subjective:  Symptoms:  Improved.  (Dysphagia/odynophagia with FB(inguprofen) removed with EGD).    Diet:  Adequate intake (tolerating clear).    Activity level: Normal.    Pain:  She reports no pain.      Objective:  General Appearance:  Comfortable.    Vital signs: (most recent): Blood pressure 101/41, pulse 64, temperature 96.3  F (35.7  C), temperature source Oral, resp. rate 16, height 1.727 m (5' 8\"), weight 62.9 kg (138 lb 9.6 oz), last menstrual period 01/19/2020, SpO2 97 %, not currently breastfeeding.  Vital signs are normal.    Output: Producing urine.    HEENT: Normal HEENT exam.    Lungs:  Normal effort and normal respiratory rate.  Breath sounds clear to auscultation.    Heart: Normal rate.  Regular rhythm.  S1 normal and S2 normal.    Abdomen: Abdomen is soft.  Bowel sounds are normal.   There is no abdominal tenderness.     Extremities: Normal range of motion.    Pulses: Distal pulses are intact.    Neurological: Patient is alert.    Pupils:  Pupils are equal, round, and reactive to light.    Skin:  Warm.      Assessment:    Condition: In stable condition.  Improving.   (19 yof with esophageal FB s/p EGD with removal, noted possible eosinophilic esophagitis per GI. Now tolerating po well-advance diet. Start meds per GI. Pt has some questions to GI-anticipate discharge after.).       Viet Gill MD, " MD  2/1/2020    The pt was seen and examined by myself. The case was reviewed and the plan was discussed with the [facility].

## 2020-02-01 NOTE — BRIEF OP NOTE
Gastroenterology Inpatient Brief Operative Note    Procedure:  Upper endoscopy   Post-operative diagnosis:  Likely eosinophilic esophagitis with food impaction   Staff Physician:   Dr. Alexander Miller   Fellow/Assistant(s):   Jelena Aceves MD   Specimens:  Please see final procedure note for further details.   Findings:  - Impacted food/pill material in mid esophagus. This was able to be pushed down to the stomach.   - Stricturing/narrowin mid esophagus with mucosal changes suspicious for EoE. S/p biopses  - Normal stomach/ duodenum     Complications:  None.   Condition:  Stable   Recommendations  Diet:  Low residue diet  PPI:  PPI po BID  Anti-coagulants/platelets:  N/A  Octreotide:  N/A  Further Recommendations:   - Patient should be started on high dose PPI twice daily in liquid form (Pantoprazole oral suspension 40 mg BID)  - Please prescribe Fluticasone 220 mcgs inhaler on discharge. Patient will need to take two puffs twice daily and swallow instead of inhaling. She should stay upright for at least 30 min after swallowing the contents of the inhaler.  - Patient should be on low residue diet till she has a follow up. Instructed to chew food properly before swallowing.  - She will need a repeat upper endoscopy in 4 weeks to reassess for response to her therapy and need for dilation.

## 2020-02-01 NOTE — PLAN OF CARE
"Shift: pt arrived to floor from PACU at 0135 - 0700  VS: /52   Pulse 65   Temp 97.3  F (36.3  C) (Oral)   Resp 18   Ht 1.727 m (5' 8\")   Wt 62.9 kg (138 lb 9.6 oz)   LMP 01/19/2020 (Exact Date)   SpO2 95%   Breastfeeding No   BMI 21.07 kg/m      Neuro: A&Ox4, CMS intact, pt managing secretions appropriately  Cardiac: post-op VS stable  Resp: lung sounds clear, no SOB reported, sating well on RA, capno intact  GI: not passing gas, bowel sounds active, LBM PTA  : voiding spontaneously, adequate amount of jordan urine   Skin: intact  Pain/Nausea: generalized throat and epigastric pain, liquid PRN tylenol ordered, denies nausea  LDA: R PIV infusing IVMF  Diet: advanced to clears this AM, pt eager to have a popsicle  Mobility: SBA, ambulated to room  Labs: reviewed  Plan: continue plan of care     "

## 2020-02-01 NOTE — PROGRESS NOTES
"PACU RN NOTE:  PT ARRIVE to PACU @ 1045  Dr. Mesa @ bedside @ 1055 states he will place sign out.   LR @ 100ml/hr to 20g PIV in Right hand  Pt appears stable. Pt denies nausea, pain, numbness, tingling, sensation change, weakness, shortness of breath, difficulty breathing, chest pain/pressure, vision changes, hearing changes or any other major form of discomfort at this time.   Pt states that throat is slightly sore but more so that she feels very dry and is tired.   Pt appears to be comfortable and trending back to baseline. VS stable   Aromatherapy utilized   Coughing and deep breathing encouraged   Family called and updated    Pt turned to left side. No skin issues noted on backside.   Face is slightly flushed present on arrival to PACU from OR.    DISCHARGE CRITERIA MET @ 9124    @ 2471 Dr. Jelena sinha paged the following:  \"I was informed that the plan for Tracie Vitale currently was to go back to ER. This has not been communicated to the ER charge RN. Please let me know the  in ER who accepted this pt. Radha BUTLER [phone]\"  "

## 2020-02-01 NOTE — ED NOTES
[facility], [place]   ED Nurse to Floor Handoff     Tracie Vitale is a 19 year old female who speaks English and lives alone,  in a home  They arrived in the ED by car from clinic    ED Chief Complaint: Swallowed Foreign Body    ED Dx;   Final diagnoses:   Acute esophageal obstruction         Needed?: No    Allergies:   Allergies   Allergen Reactions     Coconut Oil Anaphylaxis     Sesame Oil Anaphylaxis     Tree Nuts [Nuts] Anaphylaxis   .  Past Medical Hx: No past medical history on file.   Baseline Mental status: WDL  Current Mental Status changes: at basesline    Infection present or suspected this encounter: no  Sepsis suspected: No  Isolation type: No active isolations     Activity level - Baseline/Home:  Independent  Activity Level - Current:   Independent    Bariatric equipment needed?: No    In the ED these meds were given:   Medications   0.9% sodium chloride BOLUS (1,000 mLs Intravenous New Bag 1/31/20 1932)     Followed by   sodium chloride 0.9% infusion (has no administration in time range)   acetaminophen (TYLENOL) tablet 650 mg (has no administration in time range)   acetaminophen (TYLENOL) Suppository 650 mg (has no administration in time range)   naloxone (NARCAN) injection 0.1-0.4 mg (has no administration in time range)   melatonin tablet 1 mg (has no administration in time range)   ondansetron (ZOFRAN-ODT) ODT tab 4 mg (has no administration in time range)     Or   ondansetron (ZOFRAN) injection 4 mg (has no administration in time range)   sodium chloride 0.9% infusion (has no administration in time range)   ketorolac (TORADOL) injection 30 mg (30 mg Intravenous Given 1/31/20 1932)       Drips running?  Yes, NS blous    Home pump  No    Current LDAs  Peripheral IV 01/31/20 Right (Active)   Number of days: 0       Labs results: Labs Ordered and Resulted from Time of ED Arrival Up to the Time of Departure from the ED - No data to display    Imaging  "Studies: No results found for this or any previous visit (from the past 24 hour(s)).    Recent vital signs:   /79   Pulse 98   Temp 97.9  F (36.6  C) (Oral)   Resp 16   Ht 1.727 m (5' 8\")   Wt 62.1 kg (137 lb)   LMP 01/19/2020 (Exact Date)   SpO2 100%   Breastfeeding No   BMI 20.83 kg/m      [place] Coma Scale Score: 15 (01/31/20 1937)       Cardiac Rhythm: Normal Sinus  Pt needs tele? No  Skin/wound Issues: None    Code Status: Full Code    Pain control: good    Nausea control: good    Abnormal labs/tests/findings requiring intervention:     Family present during ED course? No   Family Comments/Social Situation comments:     Tasks needing completion: None    Jeronimo Horne RN  [phone] [facility]    "

## 2020-02-01 NOTE — ANESTHESIA PREPROCEDURE EVALUATION
Anesthesia Pre-Procedure Evaluation    Patient: Tracie Vitale   MRN:     [id number] Gender:   female   Age:    19 year old :      2000        Preoperative Diagnosis: Food impaction of esophagus, initial encounter [T18.128A]   Procedure(s):  ESOPHAGOGASTRODUODENOSCOPY (EGD)     No past medical history on file.   No past surgical history on file.       Anesthesia Evaluation     . Pt has not had prior anesthetic            ROS/MED HX    ENT/Pulmonary:       Neurologic:       Cardiovascular:         METS/Exercise Tolerance:     Hematologic:         Musculoskeletal:         GI/Hepatic:         Renal/Genitourinary:         Endo:         Psychiatric:         Infectious Disease:         Malignancy:         Other:                         PHYSICAL EXAM:   Mental Status/Neuro: A/A/O   Airway: Facies: Feasible  Mallampati: I  Mouth/Opening: Full  TM distance: > 6 cm  Neck ROM: Full   Respiratory: Auscultation: CTAB     Resp. Rate: Normal     Resp. Effort: Normal      CV: Rhythm: Regular  Rate: Age appropriate  Heart: Normal Sounds  Edema: None   Comments:      Dental: Normal Dentition                LABS:  CBC:   Lab Results   Component Value Date    WBC 7.6 2020    HGB 13.3 2020    HCT 42.1 2020     2020     BMP:   Lab Results   Component Value Date     2020    POTASSIUM 3.5 2020    CHLORIDE 106 2020    CO2 29 2020    BUN 11 2020    CR 0.59 2020    GLC 90 2020     COAGS: No results found for: PTT, INR, FIBR  POC: No results found for: BGM, HCG, HCGS  OTHER:   Lab Results   Component Value Date    KARTHIKEYAN 9.2 2020        Preop Vitals    BP Readings from Last 3 Encounters:   20 129/79    Pulse Readings from Last 3 Encounters:   20 98      Resp Readings from Last 3 Encounters:   20 16    SpO2 Readings from Last 3 Encounters:   20 100%      Temp Readings from Last 1 Encounters:   20 36.6  C (97.9  F)  "(Oral)    Ht Readings from Last 1 Encounters:   01/31/20 1.727 m (5' 8\")      Wt Readings from Last 1 Encounters:   01/31/20 62.1 kg (137 lb)    Estimated body mass index is 20.83 kg/m  as calculated from the following:    Height as of this encounter: 1.727 m (5' 8\").    Weight as of this encounter: 62.1 kg (137 lb).     LDA:  Peripheral IV 01/31/20 Right (Active)   Number of days: 0        Assessment:   ASA SCORE: 2 emergent           Plan:   Anes. Type:  General   Pre-Medication: None   Induction:  IV (Standard)   Airway: ETT; Oral   Access/Monitoring: PIV   Maintenance: Balanced     Postop Plan:   Postop Pain: Opioids  Postop Sedation/Airway: Not planned  Disposition: Inpatient/Admit     PONV Management:   Adult Risk Factors: Female, Postop Opioids   Prevention: Ondansetron     CONSENT: Direct conversation   Plan and risks discussed with: Patient   Blood Products: Consent Deferred (Minimal Blood Loss)                   Noman Rivers MD  "

## 2020-02-01 NOTE — H&P
ED OBSERVATION HISTORY & PHYSICAL    Admission Date: 1/31/20  Attending Physician: Dr. Zenaida Freeman  NP/PA: Abida Nina PA-C    REASON FOR ADMISSION:   Chief Complaint   Patient presents with     Swallowed Foreign Body         HPI:  Tracie Vitale is a 19 year old otherwise healthy female who presents the emergency department with chief complaint of difficulty swallowing.  The patient took an ibuprofen tablet last night.  Immediately, she developed pain and difficulty swallowing.  She has had a sensation of having the pill stuck in her throat since then.  She did not present for evaluation until later today because she went to her classes (she is currently a nursing student).  She is currently unable to swallow even her secretions.  She is also tried to drink water and has not been able to swallow this completely.  The patient has had swallowing difficulty in the past with pills, particularly round pills.  The patient took the pill approximately 10 PM last night, nothing else taken p.o around the time of onset of symptoms.  An x-ray was done at [facility] which showed signs concerning for esophageal spasm.  Possible pill fragments seen in the esophagus on x-ray as well.  Due to history, there is concern for esophageal ulcer as well.  She was sent here for further evaluation by GI due to these findings.  No history of eosinophilic esophagitis or other esophageal obstruction in the past.  No associated difficulty breathing or speaking.    Case was discussed with the GI team. Plan is to do an EGD in the OR under general anesthesia, possible tonight. Post procedure, would remain on observation for further post op monitoring.    ROS:    CONSTITUTIONAL: GDenies fever, chills, sweats, fatigue, weakness, weight loss, or appetite changes.  SKIN: Denies rash  RESPIRATORY: Denies dyspnea  CARDIOVASCULAR: Denies chest pain  GASTROINTESTINAL: Unable to swallow  HEME/LYMPH: Denies active bleeding.  ROS negative  other than the symptoms noted above.    History:    No past medical history on file.    No past surgical history on file.    No family history on file.    Social History     Socioeconomic History     Marital status: Single     Spouse name: Not on file     Number of children: Not on file     Years of education: Not on file     Highest education level: Not on file   Occupational History     Not on file   Social Needs     Financial resource strain: Not on file     Food insecurity:     Worry: Not on file     Inability: Not on file     Transportation needs:     Medical: Not on file     Non-medical: Not on file   Tobacco Use     Smoking status: Not on file   Substance and Sexual Activity     Alcohol use: Not on file     Drug use: Not on file     Sexual activity: Not on file   Lifestyle     Physical activity:     Days per week: Not on file     Minutes per session: Not on file     Stress: Not on file   Relationships     Social connections:     Talks on phone: Not on file     Gets together: Not on file     Attends Synagogue service: Not on file     Active member of club or organization: Not on file     Attends meetings of clubs or organizations: Not on file     Relationship status: Not on file     Intimate partner violence:     Fear of current or ex partner: Not on file     Emotionally abused: Not on file     Physically abused: Not on file     Forced sexual activity: Not on file   Other Topics Concern     Not on file   Social History Narrative     Not on file       No current facility-administered medications on file prior to encounter.   No current outpatient medications on file prior to encounter.      Exam:  Vitals:  B/P: 128/88, T: 97.9, P: 85, R: 16    All vital signs were reviewed.  General: Well nourished, well developed, NAD  HEENT: EOMI, anicteric. NCAT, MMM, unable to tolerate swallowing own secretions  Neck: no jugular venous distension, supple, nl ROM  Cardiac: Regular rate and rhythm. No murmurs, rubs, or  gallops. Normal S1, S2.  Intact peripheral pulses  Pulm: CTAB, no stridor, wheezes, rales, rhonchi  Abd: Soft, nontender, nondistended.  No masses palpated.    Skin: Warm and dry to the touch.  No rash  Extremities: No LE edema, no cyanosis, w/w/p  Neuro: A&Ox3, no gross focal deficits  Data:    No results found for any visits on 01/31/20.          EKG Interpretation:        Assessment/Plan:  Tracie Vitale is a 19 year old female with no significant past medical history, presenting with esophageal obstruction    1. Esophageal obstruction  - XR at [facility] showed esophageam obstruction (?spasm) but patent above and below the site.    GI consulted, plan to do EGD under general anesthesia once they secure on OR time. NPO until further notice    2. FEN/Renal  - IVF with NS at 125cc/hr for hydration    3. Headache  - received IV toradol in the ED. Likely due to dehydration      Prophy:  -No VTE prophy as patient is up ad abdulkadir and anticipate short observation stay   -Encourage ambulation as tolerated   -for GI prophy  -PRN Senna and Miralax    Consults: GI    CODE STATUS:  FULL CODE      DISPOSITION: Pending result of EGD, possibly tomorrow      Abida Nina PA-C  Emergency Department Observation Unit
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
## 2020-02-02 DIAGNOSIS — T18.128D FOOD IMPACTION OF ESOPHAGUS, SUBSEQUENT ENCOUNTER: Primary | ICD-10-CM

## 2020-02-03 ENCOUNTER — TELEPHONE (OUTPATIENT)
Dept: GASTROENTEROLOGY | Facility: CLINIC | Age: 20
End: 2020-02-03

## 2020-02-04 LAB — COPATH REPORT: NORMAL

## 2020-02-05 ENCOUNTER — PATIENT OUTREACH (OUTPATIENT)
Dept: GASTROENTEROLOGY | Facility: CLINIC | Age: 20
End: 2020-02-05

## 2020-02-05 NOTE — PROGRESS NOTES
Called pt to set up appointment in esophageal clinic per MD request. Left vm for call back.     Spoke with pt, she will be establishing GI care at [facility].

## 2020-03-11 ENCOUNTER — HEALTH MAINTENANCE LETTER (OUTPATIENT)
Age: 20
End: 2020-03-11

## 2021-01-04 ENCOUNTER — HEALTH MAINTENANCE LETTER (OUTPATIENT)
Age: 21
End: 2021-01-04

## 2021-04-25 ENCOUNTER — HEALTH MAINTENANCE LETTER (OUTPATIENT)
Age: 21
End: 2021-04-25

## 2021-10-10 ENCOUNTER — HEALTH MAINTENANCE LETTER (OUTPATIENT)
Age: 21
End: 2021-10-10

## 2022-05-22 ENCOUNTER — HEALTH MAINTENANCE LETTER (OUTPATIENT)
Age: 22
End: 2022-05-22

## 2022-09-18 ENCOUNTER — HEALTH MAINTENANCE LETTER (OUTPATIENT)
Age: 22
End: 2022-09-18

## 2023-06-04 ENCOUNTER — HEALTH MAINTENANCE LETTER (OUTPATIENT)
Age: 23
End: 2023-06-04

## (undated) DEVICE — ENDO CAP AND TUBING STERILE FOR ENDOGATOR  100130

## (undated) DEVICE — ENDO TUBING CO2 SMARTCAP STERILE DISP 100145CO2EXT

## (undated) DEVICE — PAD CHUX UNDERPAD 23X24" 7136

## (undated) DEVICE — SOL WATER IRRIG 1000ML BOTTLE 2F7114

## (undated) DEVICE — PACK ENDOSCOPY GI CUSTOM UMMC

## (undated) DEVICE — KIT ENDO FIRST STEP DISINFECTANT 200ML W/POUCH EP-4

## (undated) DEVICE — ENDO BITE BLOCK ADULT OMNI-BLOC

## (undated) DEVICE — KIT CONNECTOR FOR OLYMPUS ENDOSCOPES DEFENDO 100310

## (undated) DEVICE — ENDO FORCEP ENDOJAW BIOPSY 2.8MMX160CM FB-220K

## (undated) RX ORDER — FENTANYL CITRATE 50 UG/ML
INJECTION, SOLUTION INTRAMUSCULAR; INTRAVENOUS
Status: DISPENSED
Start: 2020-01-31

## (undated) RX ORDER — PHENYLEPHRINE HCL IN 0.9% NACL 1 MG/10 ML
SYRINGE (ML) INTRAVENOUS
Status: DISPENSED
Start: 2020-01-31

## (undated) RX ORDER — SODIUM CHLORIDE, SODIUM LACTATE, POTASSIUM CHLORIDE, CALCIUM CHLORIDE 600; 310; 30; 20 MG/100ML; MG/100ML; MG/100ML; MG/100ML
INJECTION, SOLUTION INTRAVENOUS
Status: DISPENSED
Start: 2020-01-31

## (undated) RX ORDER — EPHEDRINE SULFATE 50 MG/ML
INJECTION, SOLUTION INTRAMUSCULAR; INTRAVENOUS; SUBCUTANEOUS
Status: DISPENSED
Start: 2020-01-31